# Patient Record
Sex: FEMALE | Race: OTHER | URBAN - METROPOLITAN AREA
[De-identification: names, ages, dates, MRNs, and addresses within clinical notes are randomized per-mention and may not be internally consistent; named-entity substitution may affect disease eponyms.]

---

## 2017-06-08 ENCOUNTER — OFFICE VISIT (OUTPATIENT)
Dept: RHEUMATOLOGY | Facility: CLINIC | Age: 42
End: 2017-06-08

## 2017-06-08 VITALS
SYSTOLIC BLOOD PRESSURE: 120 MMHG | DIASTOLIC BLOOD PRESSURE: 72 MMHG | HEART RATE: 65 BPM | BODY MASS INDEX: 25.23 KG/M2 | TEMPERATURE: 98 F | HEIGHT: 63.75 IN | WEIGHT: 146 LBS

## 2017-06-08 DIAGNOSIS — M25.461 SWELLING OF RIGHT KNEE JOINT: ICD-10-CM

## 2017-06-08 DIAGNOSIS — G89.29 CHRONIC PAIN OF RIGHT KNEE: Primary | ICD-10-CM

## 2017-06-08 DIAGNOSIS — M25.561 CHRONIC PAIN OF RIGHT KNEE: Primary | ICD-10-CM

## 2017-06-08 PROCEDURE — 99204 OFFICE O/P NEW MOD 45 MIN: CPT | Performed by: INTERNAL MEDICINE

## 2017-06-08 PROCEDURE — 99212 OFFICE O/P EST SF 10 MIN: CPT | Performed by: INTERNAL MEDICINE

## 2017-06-08 RX ORDER — NAPROXEN 500 MG/1
TABLET ORAL
Refills: 0 | COMMUNITY
Start: 2017-06-03

## 2017-06-08 NOTE — PATIENT INSTRUCTIONS
1. Physical therapy for right knee  2. Naproxen 500mg once a day x 6 weeks s  3.  Return to clinic in 2-3 months if still have pain

## 2017-07-10 ENCOUNTER — TELEPHONE (OUTPATIENT)
Dept: RHEUMATOLOGY | Facility: CLINIC | Age: 42
End: 2017-07-10

## 2017-07-10 NOTE — TELEPHONE ENCOUNTER
Pt daughter calling and stts the pt was advised to call if nothing gotten better, so the dr could put in an order for a MRI. Pts daughter stts that the pt would like an MRI, and the pts insurance is ending soon and would like to get this done soon.   Call

## 2020-01-16 NOTE — PROGRESS NOTES
Cassy London is a 36year old female who presents for Patient presents with:  Muscle Pain: legs  . HPI:     I had the pleasure of seeing Cassy London on 6/8/2017 for evaluation. She is a pleasant 36year old who has right knee pain.    She has had s vomiiting, no abominal pain, no hx of ulcer, no gastritis, no heartburn, no dyshpagia, no BRBPR or melena  : no dysuria, no hx of miscarriages, no DVT Hx, no hx of OCP,   Neuro: No numbness or tingling, no headache, no hx of seizures,   Psych: no hx of a PM Debridement Text: The wound edges were debrided prior to proceeding with the closure to facilitate wound healing.

## 2023-03-30 ENCOUNTER — OFFICE VISIT (OUTPATIENT)
Dept: INTERNAL MEDICINE CLINIC | Facility: CLINIC | Age: 48
End: 2023-03-30
Payer: COMMERCIAL

## 2023-03-30 VITALS
BODY MASS INDEX: 26.43 KG/M2 | DIASTOLIC BLOOD PRESSURE: 78 MMHG | SYSTOLIC BLOOD PRESSURE: 110 MMHG | HEART RATE: 75 BPM | WEIGHT: 143.63 LBS | HEIGHT: 62 IN | OXYGEN SATURATION: 100 %

## 2023-03-30 DIAGNOSIS — L82.1 SEBORRHEIC KERATOSIS: ICD-10-CM

## 2023-03-30 DIAGNOSIS — Z00.00 HEALTH MAINTENANCE EXAMINATION: Primary | ICD-10-CM

## 2023-03-30 DIAGNOSIS — E66.3 OVERWEIGHT (BMI 25.0-29.9): ICD-10-CM

## 2023-03-30 PROCEDURE — 3074F SYST BP LT 130 MM HG: CPT | Performed by: FAMILY MEDICINE

## 2023-03-30 PROCEDURE — 99386 PREV VISIT NEW AGE 40-64: CPT | Performed by: FAMILY MEDICINE

## 2023-03-30 PROCEDURE — 90686 IIV4 VACC NO PRSV 0.5 ML IM: CPT | Performed by: FAMILY MEDICINE

## 2023-03-30 PROCEDURE — 3078F DIAST BP <80 MM HG: CPT | Performed by: FAMILY MEDICINE

## 2023-03-30 PROCEDURE — 90471 IMMUNIZATION ADMIN: CPT | Performed by: FAMILY MEDICINE

## 2023-03-30 PROCEDURE — 90715 TDAP VACCINE 7 YRS/> IM: CPT | Performed by: FAMILY MEDICINE

## 2023-03-30 PROCEDURE — 90472 IMMUNIZATION ADMIN EACH ADD: CPT | Performed by: FAMILY MEDICINE

## 2023-03-30 PROCEDURE — 3008F BODY MASS INDEX DOCD: CPT | Performed by: FAMILY MEDICINE

## 2023-03-30 NOTE — ASSESSMENT & PLAN NOTE
Exercise and diet advised. CBC, CMP, lipid panel, Hba1c, TSH, HIV screen, hepatitis C screen  Tdap today. Flu shot today. Advanced directive information provided. Advised COVID vaccine booster. Colonoscopy referral provided.   Mammogram ordered and scheduled

## 2023-03-30 NOTE — PATIENT INSTRUCTIONS
PATIENT INSTRUCTIONS    Thank you for seeing me today, it was a pleasure taking care of you. Please check out at the  and schedule a follow up appointment. Return in about 1 year (around 3/30/2024) for physical.  Please get your labs drawn - you may need to schedule a lab appointment if this was not completed at your recent doctor's visit. The following imaging studies were ordered: Mammogram 4/24 at 6:40 pm   Please also follow up with the following specialists: GI - colonoscopy  Please call [phone] to talk to an individual who will help you schedule your colonoscopy. Please fill out the advance directive information (power of  documents) and bring a copy to our clinic.   Continue healthy diet  Work on some exercise  COVID booster vaccine    Krishna,   Dr. Sathish Bennett

## 2023-03-31 ENCOUNTER — MED REC SCAN ONLY (OUTPATIENT)
Dept: INTERNAL MEDICINE CLINIC | Facility: CLINIC | Age: 48
End: 2023-03-31

## 2023-03-31 LAB
ABSOLUTE BASOPHILS: 51 CELLS/UL (ref 0–200)
ABSOLUTE EOSINOPHILS: 248 CELLS/UL (ref 15–500)
ABSOLUTE LYMPHOCYTES: 1643 CELLS/UL (ref 850–3900)
ABSOLUTE MONOCYTES: 460 CELLS/UL (ref 200–950)
ABSOLUTE NEUTROPHILS: 4898 CELLS/UL (ref 1500–7800)
ALBUMIN/GLOBULIN RATIO: 1.4 (CALC) (ref 1–2.5)
ALBUMIN: 4.1 G/DL (ref 3.6–5.1)
ALKALINE PHOSPHATASE: 73 U/L (ref 31–125)
ALT: 12 U/L (ref 6–29)
AST: 14 U/L (ref 10–35)
BASOPHILS: 0.7 %
BILIRUBIN, TOTAL: 0.4 MG/DL (ref 0.2–1.2)
BUN: 12 MG/DL (ref 7–25)
CALCIUM: 9.2 MG/DL (ref 8.6–10.2)
CARBON DIOXIDE: 24 MMOL/L (ref 20–32)
CHLORIDE: 104 MMOL/L (ref 98–110)
CHOL/HDLC RATIO: 3.8 (CALC)
CHOLESTEROL, TOTAL: 192 MG/DL
CREATININE: 0.7 MG/DL (ref 0.5–0.99)
EGFR: 107 ML/MIN/1.73M2
EOSINOPHILS: 3.4 %
GLOBULIN: 2.9 G/DL (CALC) (ref 1.9–3.7)
GLUCOSE: 104 MG/DL (ref 65–99)
HDL CHOLESTEROL: 51 MG/DL
HEMATOCRIT: 41.7 % (ref 35–45)
HEMOGLOBIN A1C: 5.3 % OF TOTAL HGB
HEMOGLOBIN: 13.9 G/DL (ref 11.7–15.5)
LDL-CHOLESTEROL: 112 MG/DL (CALC)
LYMPHOCYTES: 22.5 %
MCH: 28.3 PG (ref 27–33)
MCHC: 33.3 G/DL (ref 32–36)
MCV: 84.8 FL (ref 80–100)
MONOCYTES: 6.3 %
MPV: 12.5 FL (ref 7.5–12.5)
NEUTROPHILS: 67.1 %
NON-HDL CHOLESTEROL: 141 MG/DL (CALC)
PLATELET COUNT: 185 THOUSAND/UL (ref 140–400)
POTASSIUM: 4 MMOL/L (ref 3.5–5.3)
PROTEIN, TOTAL: 7 G/DL (ref 6.1–8.1)
RDW: 13.4 % (ref 11–15)
RED BLOOD CELL COUNT: 4.92 MILLION/UL (ref 3.8–5.1)
SIGNAL TO CUT-OFF: 0.1
SODIUM: 138 MMOL/L (ref 135–146)
TRIGLYCERIDES: 175 MG/DL
TSH W/REFLEX TO FT4: 0.58 MIU/L
WHITE BLOOD CELL COUNT: 7.3 THOUSAND/UL (ref 3.8–10.8)

## 2023-04-24 ENCOUNTER — HOSPITAL ENCOUNTER (OUTPATIENT)
Dept: MAMMOGRAPHY | Facility: HOSPITAL | Age: 48
Discharge: HOME | End: 2023-04-24
Attending: FAMILY MEDICINE
Payer: COMMERCIAL

## 2023-04-24 DIAGNOSIS — Z00.00 HEALTH MAINTENANCE EXAMINATION: ICD-10-CM

## 2023-04-24 PROCEDURE — 77067 SCR MAMMO BI INCL CAD: CPT | Performed by: FAMILY MEDICINE

## 2023-04-24 PROCEDURE — 77063 BREAST TOMOSYNTHESIS BI: CPT | Performed by: FAMILY MEDICINE

## 2023-05-16 NOTE — DISCHARGE INSTRUCTIONS
El doctor (radiologo) que hizo el examen fue: DR. Jacquie Osler    Used puede continuar con keila actividades normales, alex no participe en actividades rigurosas por 24 horas (por ejemplo, tenis, aerobicos, levantamiento de pesas, o actividades repetitivas charmaine tallar, etc.)  No nade shara 3 - 5 yeh, hasta que el sitio de la biopsia haya cerrado y cicatrizado. Ponga ilya bolsa de hielo [place], encima de la ropa y vendaje, varias veces hasta que se Tarry Denier a dormir. Puede quitarse el vendaje, si es necesario, por la manana antes de banarse. Usted puede jourdan rodríguez pecho con cuidado para que no se afloje la cinta adhesiva esterilizada. Las cintas [address] 45 o el vendaje de astrid pueden retirarse chuck yeh despues de la biopsia, o cuando las orillas se aflojen. Sanya ve tenga molestia leve o un adelia moreton en el lugar donde la aguja entro. Si necesita dayne medicamentos para las molestias, tome productos con acetaminofen, charmaine Tylenol. No tome productos que contengan aspirina o NSAID (antiinflamitorio no esteroideo) shara 48 horas. Si sangra much o si tiene mucho dolor o fiebre (mas de 100 grados F) informelo al doctor que hizo la biopsia o a rodríguez medico familiar. Si tiene dudas o inquietudes, por favor llame a rodríguez medico familiar a la Enfermera(o) de radiologia al [phone] de 8 am a 4 pm. Nota: Despues de las 4 pm pida que la comuniquen con el medico de [facility] o acuda a la [facility].

## 2023-05-17 NOTE — PROCEDURES
[facility]  Procedure Note    Flavio Amaro Patient Status:  Outpatient    1975 MRN [id number]   Location [address] Attending Yefri Dan MD   Hosp Day # 0 PCP Wilder Stone MD     Procedure: Right breast ultrasound guided biopsy 2 sites    Pre-Procedure Diagnosis:  Right breast masses    Post-Procedure Diagnosis: Right breast masses    Anesthesia:  Local    Findings:  Multiple cores at each site    Specimens: minimal    Blood Loss:  none    Tourniquet Time: none  Complications:  None  Drains:  none        Roc Astorga MD  2023

## 2024-10-10 NOTE — PATIENT INSTRUCTIONS
PATIENT INSTRUCTIONS    Thank you for seeing me today, it was a pleasure taking care of you.  Please check out at the  and schedule a follow up appointment.  Return in about 1 year (around 10/10/2025) for physical.  Please remember that the lexi period for all appointments is 5 minutes. This is to help maximize the amount of time that we can spend together at our visits.    Please get your labs drawn - you may need to schedule a lab appointment if this was not completed at your recent doctor's visit.  The following imaging studies were ordered: Mammogram  Please call [phone] to schedule your imaging appointment.   Please also follow up with the following specialists: GI - colonoscopy  Please call [phone] to talk to an individual who will help you schedule your colonoscopy.  Please fill out the advance directive information (power of  documents) and bring a copy to our clinic.  Continue to focus on a healthy diet and exercises  Rest your leg as much as possible  Wear shoes with good foot support  Stretches and exercises  Physical therapy      Krishna,   Dr. Dorantes

## 2024-10-10 NOTE — PROGRESS NOTES
FAMILY MEDICINE CLINIC NOTE    HPI  Marly Schmitz is a 49 year old female presenting for physical    #Health Maintenance  -Diet: Good - eats healthy - fruits and vegetables.   -Exercise: Not very much - just work.   -Lung cancer screen: Not indicated  -Colon cancer screen: Indicated  -Statin:  Will check lipid panel  -ASA: Not indicated  -Breast cancer screen: Indicated  -Breast cancer medication to reduce risk: Declines   -Periods: Menopause - hysterectomy.  -Cervical cancer screen: - Total abdominal hysterectomy  -DEXA: Not indicated  -BRCA: Not indicated  -Intimate partner violence: Denies abuse  -HIV screen: 3/2023 negative  -Hep C screen: 3/2023 negative  -Gonorrhea/chlamydia:  Not Indicated  -Syphillis: Not indicated  -TB: Not indicated  -Tobacco/alcohol: Per below  -Depression: PHQ-2 score of 0 (score >/= 3 do PHQ-9)  -Advanced Directive: Indicated     #Immunizations  -Tdap: 3/2023  -Flu shot: Indicated  -PCV13: Not indicated   -PCV20: Not indicated   -PPSV23: Not indicated   -HPV: Not indicated  -RZV (preferred) or VZL: Not indicated   -COVID: Moderna     #Abnormal mammogram  -needs repeat diagnostic mammogram    #L ankle pain  -1 month   -she believes she was pushing a heavy box with her foot then had pain after  -by the posterior ankle  -no improvement in the last 1 month   -ibuprofen  -hurts to walk      #Patient Care Team  Patient Care Team:  Edwin Dorantes MD as PCP - General (Family Medicine)  Twin Nguyen (Gynecology Oncology)    ROS  GENERAL: No fever/chills, no recent weight loss   HEENT: No visual changes, no changes in hearing, no sore throats  NECK: No pain, no swelling  RESP: No cough, no SOB  CV: No chest pain, no palpitations  GI: No abd pain, no N/V/D  MSK: No edema, no pain  SKIN: No new rashes  NEURO: No numbness, no tingling, no HA    HEALTH MAINTENANCE CHECKLIST  Health Maintenance Topics with due status: Overdue       Topic Date Due    Colorectal Cancer Screening Never  done    Annual Depression Screening 01/01/2024    Annual Physical 03/30/2024    Zoster Vaccines Never done    COVID-19 Vaccine 09/01/2024    Influenza Vaccine 10/01/2024       ALLERGIES  Allergies[1]    MEDICATIONS  Current Outpatient Medications   Medication Sig Dispense Refill    ibuprofen 200 MG Oral Tab Take 1 tablet (200 mg total) by mouth every 6 (six) hours as needed for Pain.      Cholecalciferol (VITAMIN D) 50 MCG (2000 UT) Oral Cap Take by mouth. OTC         ACTIVE PROBLEM  Patient Active Problem List   Diagnosis    Health maintenance examination    Overweight (BMI 25.0-29.9)    Seborrheic keratosis    Abnormal mammogram    Fibroadenoma of right breast    Achilles tendinitis of left lower extremity       PAST MEDICAL HISTORY  Past Medical History:    Cystadenofibroma of left ovary    Left ovary with seromucinous cystadenofibroma (9.2 cm in greatest dimension) and cystic follicles.    Fibroadenoma of right breast    History of uterine fibroid       PAST SOCIAL HISTORY  Social History     Socioeconomic History    Marital status:      Spouse name: Not on file    Number of children: Not on file    Years of education: Not on file    Highest education level: Not on file   Occupational History    Not on file   Tobacco Use    Smoking status: Never    Smokeless tobacco: Never   Vaping Use    Vaping status: Never Used   Substance and Sexual Activity    Alcohol use: No    Drug use: No    Sexual activity: Yes     Partners: Male     Birth control/protection: Hysterectomy   Other Topics Concern    Not on file   Social History Narrative    Relationships:  - Emelio    Children: Daughter - Flower (MA)Maynor    Pets: 2 Dogs    School: N/A    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     Origin: From [place], came to  1997    Interests: Enjoys gardening, reading.     Spiritual: Denominational - important to her.      Social Drivers of Health     Financial Resource Strain: Not on file   Food Insecurity:  Not on file   Transportation Needs: Not on file   Physical Activity: Not on file   Stress: Not on file   Social Connections: Not on file   Housing Stability: Not on file       PAST SURGICAL HISTORY  Past Surgical History:   Procedure Laterality Date          x2    Camron localization wire 1 site right (cpt=19281)      Needle biopsy right  2023    us guided bx    Total abdominal hysterectomy  2021    Total hysterectomy with left oophorectomy and bilateral salpingectomy       PAST FAMILY HISTORY  Family History   Problem Relation Age of Onset    Thyroid disease Mother     Diabetes Father     No Known Problems Sister     No Known Problems Sister     No Known Problems Sister     No Known Problems Brother     No Known Problems Brother     No Known Problems Maternal Grandmother     No Known Problems Maternal Grandfather     No Known Problems Paternal Grandmother     No Known Problems Paternal Grandfather     Breast Cancer Neg     Colon Cancer Neg        PHYSICAL EXAM  Vitals:    10/10/24 1813   BP: 108/62   Pulse: 70   Temp: 97.6 °F (36.4 °C)   SpO2: 99%   Weight: 148 lb (67.1 kg)   Height: 5' 2\" (1.575 m)      Body mass index is 27.07 kg/m².    GENERAL: NAD  HEENT: Moist mucous membranes, no tonsillar swelling or erythema, PERRLA bilat, TM translucent and non-bulging  NECK: Supple, non-tender  RESP: CTAB, no wheezing, no rales, no rhonchi  CV: RRR, no murmurs  GI: Soft, non-distended, non-tender, no guarding, no rebound, no masses  MSK: trace edema to the posterior ankle in the region of the achilles tendon. Minimal tenderness to palpation of the left achilles tendon. Guzman squeeze test negative bilaterally. No bony tenderness throughout the bilateral feet and ankle.  Slightly decreased strength with dorsiflexion and plantarflexion of the left ankle compared to the right ankle.  SKIN: Warm and dry, + several light brown stuck on appearing papules throughout the back   NEURO: Answering questions  appropriately    LABS  Lab Results   Component Value Date    WBC 7.3 03/30/2023    HGB 13.9 03/30/2023    HCT 41.7 03/30/2023     03/30/2023    NEPERCENT 67.1 03/30/2023    LYPERCENT 22.5 03/30/2023    MOPERCENT 6.3 03/30/2023    EOPERCENT 3.4 03/30/2023    BAPERCENT 0.7 03/30/2023    NE 4,898 03/30/2023    LYMABS 1,643 03/30/2023    MOABSO 460 03/30/2023    EOABSO 248 03/30/2023    BAABSO 51 03/30/2023       Lab Results   Component Value Date     03/30/2023    K 4.0 03/30/2023     03/30/2023    CO2 24 03/30/2023    BUN 12 03/30/2023    CREATSERUM 0.70 03/30/2023    BUNCREA NOT APPLICABLE 03/30/2023     (H) 03/30/2023    CA 9.2 03/30/2023    ALT 12 03/30/2023    AST 14 03/30/2023    ALKPHO 73 03/30/2023    BILT 0.4 03/30/2023    TP 7.0 03/30/2023    ALB 4.1 03/30/2023    GLOBULIN 2.9 03/30/2023         Lab Results   Component Value Date    CHOLEST 192 03/30/2023    TRIG 175 (H) 03/30/2023    HDL 51 03/30/2023     (H) 03/30/2023    TCHDLRATIO 3.8 03/30/2023    NONHDLC 141 (H) 03/30/2023        DIAGNOSTICS    ASSESSMENT/PLAN  Problem List Items Addressed This Visit          Endocrine and Metabolic    Overweight (BMI 25.0-29.9)     Healthy diet and exercise advised.  Monitor labs         Relevant Orders    Comp Metabolic Panel (14)    Hemoglobin A1C    Lipid Panel       Musculoskeletal and Injuries    Achilles tendinitis of left lower extremity     Symptoms consistent with Achilles tendinitis of the left lower extremity  Guzman squeeze test negative.  Advise resting, can use Advil as needed.  Stretches and exercises provided  Given persistent nature, will refer to physical therapy  If without gradual improvement, consider ultrasound.  Follow-up as needed.         Relevant Medications    ibuprofen 200 MG Oral Tab    Cholecalciferol (VITAMIN D) 50 MCG (2000 UT) Oral Cap    Other Relevant Orders    Physical Therapy Referral - [place] Locations       Genitourinary and Reproductive     Abnormal mammogram     Needs repeat mammogram         Relevant Medications    ibuprofen 200 MG Oral Tab    Fibroadenoma of right breast     History of fibroadenoma.          Relevant Medications    ibuprofen 200 MG Oral Tab       Health Encounters    Health maintenance examination - Primary     Exercise and diet advised.  CMP, lipid panel, Hba1c  Flu shot today.  Shingrex vaccine today.  Advanced directive information provided.  Advised COVID vaccine booster.  Colonoscopy referral provided.  Mammogram - needs to repeat           Relevant Orders    [facility] GI Telephone Colon Screen    ZOSTER VACC RECOMBINANT IM NJX (Completed)    INFLUENZA VACCINE, TRI, PRESERV FREE, 0.5 ML (Completed)    Comp Metabolic Panel (14)    Hemoglobin A1C    Lipid Panel       Return in about 1 year (around 10/10/2025) for physical.    Edwin Dorantes MD  Family Medicine         [1] No Known Allergies

## 2024-10-10 NOTE — ASSESSMENT & PLAN NOTE
Exercise and diet advised.  CMP, lipid panel, Hba1c  Flu shot today.  Shingrex vaccine today.  Advanced directive information provided.  Advised COVID vaccine booster.  Colonoscopy referral provided.  Mammogram - needs to repeat

## 2024-10-10 NOTE — ASSESSMENT & PLAN NOTE
Symptoms consistent with Achilles tendinitis of the left lower extremity  Guzman squeeze test negative.  Advise resting, can use Advil as needed.  Stretches and exercises provided  Given persistent nature, will refer to physical therapy  If without gradual improvement, consider ultrasound.  Follow-up as needed.

## 2024-11-25 NOTE — PROGRESS NOTES
FAMILY MEDICINE CLINIC NOTE    HPI  Marly Schmitz is a 49 year old female presenting for       #Breast pain  -R side  -pain  -occurred after mammogram - the next day on Saturday  -redness, warmth  -tenderness  -warm compresses and ibuprofen  -redness is faded     #Abnormal mammogram  -needs repeat diagnostic mammogram in 6 months      #L ankle pain  -1 month   -she believes she was pushing a heavy box with her foot then had pain after  -by the posterior ankle  -no improvement in the last 1 month   -ibuprofen  -hurts to walk  11/2024  -has been doing stretches and exercises  -still on feet for work  -has PT ordered if needed    ----other    #HLD  -lipid panel 10/2024    ROS  GENERAL: No fever/chills, no recent weight loss  HEENT: No visual changes, no changes in hearing, no sore throats  NECK: No pain, no swelling  RESP: No cough, no SOB  CV: No chest pain, no palpitations  GI: No abd pain, no N/V/D  MSK: No edema  SKIN: No new rashes  NEURO: No numbness, no tingling, no headaches    HEALTH MAINTENANCE  Health Maintenance Topics with due status: Overdue       Topic Date Due    Colorectal Cancer Screening Never done    COVID-19 Vaccine 09/01/2024       ALLERGIES  Allergies[1]    MEDICATIONS  Current Outpatient Medications   Medication Sig Dispense Refill    ibuprofen 200 MG Oral Tab Take 1 tablet (200 mg total) by mouth every 6 (six) hours as needed for Pain.      Cholecalciferol (VITAMIN D) 50 MCG (2000 UT) Oral Cap Take by mouth. OTC         ACTIVE PROBLEMS  Patient Active Problem List   Diagnosis    Health maintenance examination    Overweight (BMI 25.0-29.9)    Seborrheic keratosis    Abnormal mammogram    Fibroadenoma of right breast    Achilles tendinitis of left lower extremity    Hyperlipidemia    Breast pain    Mass of upper inner quadrant of right breast       PAST MEDICAL HISTORY  Past Medical History:    Cystadenofibroma of left ovary    Left ovary with seromucinous cystadenofibroma (9.2 cm  in greatest dimension) and cystic follicles.    Fibroadenoma of right breast    History of uterine fibroid    Hyperlipidemia       PAST SOCIAL HISTORY  Social History     Socioeconomic History    Marital status:      Spouse name: Not on file    Number of children: Not on file    Years of education: Not on file    Highest education level: Not on file   Occupational History    Not on file   Tobacco Use    Smoking status: Never    Smokeless tobacco: Never   Vaping Use    Vaping status: Never Used   Substance and Sexual Activity    Alcohol use: No    Drug use: No    Sexual activity: Yes     Partners: Male     Birth control/protection: Hysterectomy   Other Topics Concern    Not on file   Social History Narrative    Relationships:  - Emelio    Children: Daughter - Flower (MA)Maynor    Pets: 2 Dogs    School: N/A    Work: Works in a factory - containers - .     Origin: From [place], came to      Interests: Enjoys gardening, reading.     Spiritual: Confucianism - important to her.      Social Drivers of Health     Financial Resource Strain: Not on file   Food Insecurity: Not on file   Transportation Needs: Not on file   Physical Activity: Not on file   Stress: Not on file   Social Connections: Not on file   Housing Stability: Not on file       PAST SURGICAL HISTORY  Past Surgical History:   Procedure Laterality Date          x2    Camron localization wire 1 site right (cpt=19281)      Needle biopsy right  2023    us guided bx    Total abdominal hysterectomy  2021    Total hysterectomy with left oophorectomy and bilateral salpingectomy       PAST FAMILY HISTORY  Family History   Problem Relation Age of Onset    Thyroid disease Mother     Diabetes Father     No Known Problems Sister     No Known Problems Sister     No Known Problems Sister     No Known Problems Brother     No Known Problems Brother     No Known Problems Maternal Grandmother     No Known Problems Maternal Grandfather      No Known Problems Paternal Grandmother     No Known Problems Paternal Grandfather     Breast Cancer Neg     Colon Cancer Neg     Ovarian Cancer Neg     Pancreatic Cancer Neg          PHYSICAL EXAM  Vitals:    11/26/24 1555   BP: 122/70   Pulse: 73   SpO2: 99%   Weight: 148 lb 6.4 oz (67.3 kg)   Height: 5' 2\" (1.575 m)      Body mass index is 27.14 kg/m².    GENERAL: NAD  RESP: Non-labored respirations, CTAB, no wheezing, no rales, no rhonchi  MSK: No edema. No significant tenderness to the left achilles today.  BREAST: Bilateral breasts are symmetric. Right upper inner quadrant of the breast is slightly pink, without significant tenderness at this time. There are palpable masses 2x3 cm at 3cm from the nipple at the 1 o'clock region and 2x3 at 7 cm from the nipple at the 2 o'clock region of the right breast. No palpable mass of the left breast. No discharge or warmth of the bilateral breasts. No axillary lymphadenopathy. Breast exam performed with female medical assistant and daughter Flower in room.   SKIN: Warm and dry, no rashes  NEURO: Answering questions appropriately    LABS  Lab Results   Component Value Date    WBC 7.3 03/30/2023    HGB 13.9 03/30/2023    HCT 41.7 03/30/2023     03/30/2023    NEPERCENT 67.1 03/30/2023    LYPERCENT 22.5 03/30/2023    MOPERCENT 6.3 03/30/2023    EOPERCENT 3.4 03/30/2023    BAPERCENT 0.7 03/30/2023    NE 4,898 03/30/2023    LYMABS 1,643 03/30/2023    MOABSO 460 03/30/2023    EOABSO 248 03/30/2023    BAABSO 51 03/30/2023       Lab Results   Component Value Date     10/24/2024    K 4.4 10/24/2024     10/24/2024    CO2 26 10/24/2024    BUN 16 10/24/2024    CREATSERUM 0.70 10/24/2024    BUNCREA SEE NOTE: 10/24/2024    GLU 96 10/24/2024    CA 9.2 10/24/2024    ALT 16 10/24/2024    AST 17 10/24/2024    ALKPHO 76 10/24/2024    BILT 0.6 10/24/2024    TP 6.9 10/24/2024    ALB 4.2 10/24/2024    GLOBULIN 2.7 10/24/2024         Lab Results   Component Value Date     CHOLEST 203 (H) 10/24/2024    TRIG 200 (H) 10/24/2024    HDL 44 (L) 10/24/2024     (H) 10/24/2024    TCHDLRATIO 4.6 10/24/2024    NONHDLC 159 (H) 10/24/2024        DIAGNOSTICS      ASSESSMENT/PLAN  Problem List Items Addressed This Visit          Musculoskeletal and Injuries    Achilles tendinitis of left lower extremity     Patient with Achilles tendinitis of the left lower extremity  Advise resting, can use Advil as needed.  Stretches and exercises-advised to continue  Referred to physical therapy-advised to go see  If without gradual improvement, consider ultrasound.  Follow-up as needed.            Genitourinary and Reproductive    Breast pain - Primary     Patient with recent breast pain, redness and warmth localized to the upper inner quadrant of her right breast.  This initially occurred after her mammogram.   Daughter reports it was significantly red and warm and tender over the weekend.  She reports that since yesterday though, the redness, warmth and pain have gradually subsided.  There are palpable masses appreciated in the right breast, however she does have a history of fibroadenoma.  Advise breast ultrasound to further evaluate.  Pending result, consider breast surgery evaluation.         Relevant Orders    US BREAST RIGHT COMPLETE (CPT=76641)    Fibroadenoma of right breast     History of fibroadenoma.          Mass of upper inner quadrant of right breast     Patient with recent breast pain, redness and warmth localized to the upper inner quadrant of her right breast.  This initially occurred after her mammogram.   Daughter reports it was significantly red and warm and tender over the weekend.  She reports that since yesterday though, the redness, warmth and pain have gradually subsided.  There are palpable masses appreciated in the right breast, however she does have a history of fibroadenoma.  Advise breast ultrasound to further evaluate.  Pending result, consider breast surgery evaluation.          Relevant Orders    US BREAST RIGHT COMPLETE (CPT=76641)       Return if symptoms worsen or fail to improve.    This is a Header Test   Topic Date Due    Annual Physical  10/10/2025        Edwin Dorantes MD  Family Medicine           Pre-chartin minutes  Reviewing/obtaining: 10 minutes  Medical Exam:1 minutes  Counseling/education: 5 minutes  Notes: 5 minutes  Referring/communicatin minutes  Care coordination: 0 minutes    My total time spent caring for the patient on the day of the encounter: 23 minutes         [1] No Known Allergies

## 2024-11-26 NOTE — ASSESSMENT & PLAN NOTE
Patient with recent breast pain, redness and warmth localized to the upper inner quadrant of her right breast.  This initially occurred after her mammogram.   Daughter reports it was significantly red and warm and tender over the weekend.  She reports that since yesterday though, the redness, warmth and pain have gradually subsided.  There are palpable masses appreciated in the right breast, however she does have a history of fibroadenoma.  Advise breast ultrasound to further evaluate.  Pending result, consider breast surgery evaluation.

## 2024-11-26 NOTE — PATIENT INSTRUCTIONS
PATIENT INSTRUCTIONS    Thank you for seeing me today, it was a pleasure taking care of you.  Please check out at the  and schedule a follow up appointment.  Return if symptoms worsen or fail to improve.  Please remember that the preferred lexi period for appointments is 5 minutes. This is to help maximize the amount of time that we can spend together at our visits.    Breast ultrasound  Please call [phone] to schedule your imaging appointment.   For now taking tylenol and advil as needed    Best,  Dr. Dorantes

## 2025-05-24 NOTE — ED INITIAL ASSESSMENT (HPI)
Pt with pain to posterior L leg and L ankle for months that worsened today. Pt reports taking Advil for pain today.

## 2025-05-24 NOTE — ED PROVIDER NOTES
Chief Complaint   Patient presents with    Ankle Pain       History obtained from: patient   services used, [place] ID [id number]    HPI:     Marly Schmitz is a 50 year old female who presents with intermittent left ankle pain x 6 months that is acutely worse today. Patient states she saw her doctor for this several months ago who gave her medicine and physical therapy which improved symptoms but pain never fully resolved. Patient states she works on her feet for long shifts and is requesting a note for work. Patient took Advil today. Denies injury/trauma, falls, wound, change in skin color/temperature, calf pain or swelling. Denies hx of blood clots, malignancy, recent immobilization, or exogenous estrogen use.     PMH  Past Medical History[1]    PFSH    PFSH asessment screens reviewed and agree.  Nurses notes reviewed I agree with documentation.    Family History[2]  Family history reviewed with patient/caregiver and is not pertinent to presenting problem.  Social History     Socioeconomic History    Marital status:      Spouse name: Not on file    Number of children: Not on file    Years of education: Not on file    Highest education level: Not on file   Occupational History    Not on file   Tobacco Use    Smoking status: Never    Smokeless tobacco: Never   Vaping Use    Vaping status: Never Used   Substance and Sexual Activity    Alcohol use: No    Drug use: No    Sexual activity: Yes     Partners: Male     Birth control/protection: Hysterectomy   Other Topics Concern    Not on file   Social History Narrative    Relationships:  - Emelio    Children: Daughter - Flower ([place]), Maynor    Pets: 2 Dogs    School: N/A    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     Origin: From [place], came to  1997    Interests: Enjoys gardening, reading.     Spiritual: Congregation - important to her.      Social Drivers of Health     Food Insecurity: Not on file   Transportation Needs: Not on  file   Housing Stability: Not on file         ROS:   Positive for stated complaint: left ankle pain   Other systems are as noted in HPI.   All other systems reviewed and negative except as noted above.    Physical Exam:   Vital signs and nursing note reviewed.       /58   Pulse 78   Temp 97.9 °F (36.6 °C) (Oral)   Resp 18   SpO2 99%     GENERAL: well developed, no acute distress, non-toxic appearing   SKIN: good skin turgor, no obvious rashes  HEAD: normocephalic, atraumatic  EYES: sclera non-icteric bilaterally, conjunctiva clear bilaterally  OROPHARYNX: MMM, maintaining airway and secretions  NECK: no nuchal rigidity, no trismus, no edema, phonation normal    CARDIO: regular rate, DP pulse 2+ bilaterally, cap refill < 2 sec   LUNGS: no increased WOB  EXTREMITIES: tenderness to lateral left ankle and posterior left ankle over achilles tendon, no obvious swelling or deformity, FROM, no foot tenderness, no calf swelling or tenderness, compartments soft, CMS intact, skin intact without overlying changes   NEURO: no focal deficits  PSYCH: alert and oriented x3, answering questions appropriately, mood appropriate    MDM/Assessment/Plan:   Orders for this encounter:    Orders Placed This Encounter    XR ANKLE (MIN 3 VIEWS), LEFT (CPT=73610)     What is the Relevant Clinical Indication / Reason for Exam?:   intermittent lateral and posterior ankle pain x 6 months     Release to patient:   Immediate    Ace wrap     To affected area    Ankle; Left     Splint type:   Ankle     Location specifics:   Left    naproxen 500 MG Oral Tab     Sig: Take 1 tablet (500 mg total) by mouth 2 (two) times daily as needed.     Dispense:  20 tablet     Refill:  0       Labs performed this visit:  No results found for this or any previous visit (from the past 10 hours).    Imaging performed this visit:  XR ANKLE (MIN 3 VIEWS), LEFT (CPT=73610)   Final Result   PROCEDURE: XR ANKLE (MIN 3 VIEWS), LEFT (CPT=73610)        COMPARISON: None.       INDICATIONS: Intermittent left  lateral and posterior ankle pain x 6    months. No known injury.       Findings and impression:  Normal alignment.  No fracture.  Mild    calcification and thickening of the distal Achilles   Finalized by (CST): Eliel Gilmore MD on 5/24/2025 at 11:45 AM                            Medical Decision Making  DDx includes ankle sprain versus osteoarthritis versus contusion versus tendinitis versus other.  Patient is overall well-appearing with stable vitals presenting with intermittent ankle pain x 6 months.  No history of injury or trauma.  No signs of neurovascular compromise or compartment syndrome.  X-ray independently reviewed, no fracture, calcification and thickening of distal Achilles tendon noted.  Pain is localized to ankle joint and has been ongoing for several months and patient has no risk factors for blood clot therefore shared decision making employed with patient to defer ultrasound of lower extremity at this time.  Ace wrap and velcro ankle splint applied.  Patient ambulating with steady gait.  Discussed supportive care including rest, ice, elevation, and compression.  Rx naproxen as needed for pain. Instructed patient to go directly to nearest ER with any worsening or concerning symptoms. Follow up with ortho. Work note provided.     Amount and/or Complexity of Data Reviewed  Radiology: ordered and independent interpretation performed.    Risk  OTC drugs.  Prescription drug management.        Diagnosis:    ICD-10-CM    1. Left ankle pain, unspecified chronicity  M25.572 XR ANKLE (MIN 3 VIEWS), LEFT (CPT=73610)     XR ANKLE (MIN 3 VIEWS), LEFT (CPT=73610)      2. Achilles tendinitis of left lower extremity  M76.62           All results reviewed and discussed with patient/patient's family. Patient/patient's family verbalize excellent understanding of instructions and feels comfortable with plan. All of patient's/patient's family's questions were  addressed.   See AVS for detailed discharge instructions for your condition today.    Follow Up with:  [facility]  Call [phone], option 3 to arrange ortho follow up          Note: This document was dictated using Dragon medical dictation software.  Proofreading was performed to the best of my ability, but errors may be present.    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    Fibroadenoma of right breast    History of uterine fibroid    Hyperlipidemia   [2]   Family History  Problem Relation Age of Onset    Thyroid disease Mother     Diabetes Father     No Known Problems Sister     No Known Problems Sister     No Known Problems Sister     No Known Problems Brother     No Known Problems Brother     No Known Problems Maternal Grandmother     No Known Problems Maternal Grandfather     No Known Problems Paternal Grandmother     No Known Problems Paternal Grandfather     Breast Cancer Neg     Colon Cancer Neg     Ovarian Cancer Neg     Pancreatic Cancer Neg

## 2025-05-24 NOTE — DISCHARGE INSTRUCTIONS
Descanse, aplique hielo y eleve el tobillo.  Naproxeno según sea necesario para el dolor, hasta 2 veces al día.  Use ilya férula de tobillo siempre que esté de pie y caminando.  Yesenia mucho líquido.  Descanse jocelin.  Evite torcerse, agacharse o levantar objetos en exceso.    Para programar ilya taylor con el Departamento de Ortopedia y Medicina Deportiva, envíe un mensaje de texto o llame al [phone] y seleccione la opción 3 cuando se le indique.

## 2025-06-20 NOTE — TELEPHONE ENCOUNTER
Went to urgent care 5/2025  Xray 5/2025 Findings and impression:  Normal alignment.  No fracture.  Mild calcification and thickening of the distal Achilles   Feeling better  Still swelling  Interested in physical therapy  Seen in office today, briefly dropped by with daughter.  No tenderness to palpation , has good strength.   But still with mild swelling and worries about recurrence.  History of chronic achilles tendonitis  Will refer to PT

## (undated) NOTE — LETTER
Date & Time: 5/24/2025, 11:55 AM  Patient: Marly Scmhitz  Encounter Provider(s):    Riddhi Davey PA-C       To Whom It May Concern:    Marly Schmitz was seen and treated in our department on 5/24/2025. She can return to work 5/29/2025 .    If you have any questions or concerns, please do not hesitate to call.        _____________________________  Physician/APC Signature

## (undated) NOTE — LETTER
[address]  [id number]  [phone]      May 17, 2023    Patient: Fannie Fuentes   Date of Visit: 5/17/2023       To Whom It May Concern:    Fannie Fuentes was seen and treated in department on 5/17/2023. She may return to work without restrictions on Monday, May 22nd 2023. If you have any questions or concerns, please don't hesitate to call.       Encounter Provider(s):    LAUREN Erazo Dr     8:42 AM  5/17/2023

## (undated) NOTE — MR AVS SNAPSHOT
[facility]  [address] [place] [address], [address] [facility]  Terrance Salvador               Thank you for choosing us for your health care visit with Giovani Bradford MD.  We are glad to serve you and happy to provide you with this summary service number located on your ID card. To schedule Physical Therapy at any of the [facility] facilities, please call [phone]. Center for LUIS MARQUES [facility]  [address]  [address] Now link in the [email or web address]. Enter your [employer] Activation Code exactly as it appears below along with your Zip Code and Date of Birth to complete the sign-up process. If you do not sign up before the expiration date, you must request a new code.     Paulino Angelo priority on exercise in your life                    Visit [facility] online at  [email or web address]